# Patient Record
Sex: FEMALE | Race: BLACK OR AFRICAN AMERICAN | NOT HISPANIC OR LATINO | URBAN - METROPOLITAN AREA
[De-identification: names, ages, dates, MRNs, and addresses within clinical notes are randomized per-mention and may not be internally consistent; named-entity substitution may affect disease eponyms.]

---

## 2019-11-05 PROBLEM — Z00.00 ENCOUNTER FOR PREVENTIVE HEALTH EXAMINATION: Status: ACTIVE | Noted: 2019-11-05

## 2019-11-12 ENCOUNTER — OUTPATIENT (OUTPATIENT)
Dept: OUTPATIENT SERVICES | Facility: HOSPITAL | Age: 39
LOS: 1 days | End: 2019-11-12
Payer: MEDICAID

## 2019-11-12 ENCOUNTER — APPOINTMENT (OUTPATIENT)
Dept: ORTHOPEDIC SURGERY | Facility: CLINIC | Age: 39
End: 2019-11-12
Payer: MEDICAID

## 2019-11-12 VITALS
BODY MASS INDEX: 31.83 KG/M2 | WEIGHT: 210 LBS | HEIGHT: 68 IN | DIASTOLIC BLOOD PRESSURE: 70 MMHG | SYSTOLIC BLOOD PRESSURE: 110 MMHG

## 2019-11-12 DIAGNOSIS — Z82.49 FAMILY HISTORY OF ISCHEMIC HEART DISEASE AND OTHER DISEASES OF THE CIRCULATORY SYSTEM: ICD-10-CM

## 2019-11-12 DIAGNOSIS — Z78.9 OTHER SPECIFIED HEALTH STATUS: ICD-10-CM

## 2019-11-12 DIAGNOSIS — Z83.3 FAMILY HISTORY OF DIABETES MELLITUS: ICD-10-CM

## 2019-11-12 DIAGNOSIS — M16.0 BILATERAL PRIMARY OSTEOARTHRITIS OF HIP: ICD-10-CM

## 2019-11-12 DIAGNOSIS — Z72.3 LACK OF PHYSICAL EXERCISE: ICD-10-CM

## 2019-11-12 PROCEDURE — 73564 X-RAY EXAM KNEE 4 OR MORE: CPT | Mod: 26,LT,RT

## 2019-11-12 PROCEDURE — 72170 X-RAY EXAM OF PELVIS: CPT

## 2019-11-12 PROCEDURE — 99204 OFFICE O/P NEW MOD 45 MIN: CPT

## 2019-11-12 PROCEDURE — 73564 X-RAY EXAM KNEE 4 OR MORE: CPT

## 2019-11-12 PROCEDURE — 72170 X-RAY EXAM OF PELVIS: CPT | Mod: 26

## 2019-11-12 RX ORDER — MULTIVIT-MIN/IRON/FOLIC ACID/K 18-600-40
CAPSULE ORAL
Refills: 0 | Status: ACTIVE | COMMUNITY

## 2019-11-12 RX ORDER — GLUC/MSM/COLGN2/HYAL/ANTIARTH3 375-375-20
TABLET ORAL
Refills: 0 | Status: ACTIVE | COMMUNITY

## 2019-11-12 NOTE — PROCEDURE
[Injection] : Injection [Effusion] : Effusion [Left] : of the left [Knee Joint] : knee joint [Patient] : patient [Diagnostic] : Diagnostic [Inflammation] : Inflammation [Alcohol] : Alcohol [Betadine] : Betadine [Ethyl Chloride Spray] : ethyl chloride spray was used as a topical anesthetic [Lateral] : lateral [18] : an 18-gauge [1% Lidocaine___(mL)] : [unfilled] mL of 1% Lidocaine [Triamcinolone 40mg/mL___(mL)] : [unfilled] ~UmL of 40mg/mL triamcinolone [Bandage Applied] : a bandage [Tolerated Well] : The patient tolerated the procedure well [None] : none

## 2019-11-12 NOTE — DISCUSSION/SUMMARY
[de-identified] : 38y/o female with left knee medial meniscal tear, left knee osteoarthritis, bilateral hip osteoarthritis, left sided sciatica\par - Discussed natural history of these issues with her and reviewed treatment options. She seemed to be interested in left knee arthroscopy but given that she hasn't had mechanical symptoms in some months and many other sources of pain, I'd like to try other options first\par - CSI left knee given as detailed above; patient tolerated well and reported improved pain and ease of ambulation\par - Start meloxicam\par - Cont PT\par - Refer to nutritionist for weight loss\par - RTC 8 weeks. If any repeat mechanical symptoms or progression of medial left knee pain, would consider L knee arthroscopy. Also may consider referral to Pain pending progress with the sciatica

## 2019-11-12 NOTE — PHYSICAL EXAM
[de-identified] : General appearance: well nourished and hydrated, pleasant, alert and oriented x 3, cooperative.  \par         HEENT Normocephalic, EOM intact, Nasal septum midline, Oral cavity clear, External auditory canal clear.  \par         Cardiovascular: no apparent abnormalities, no lower leg edema, no varicosities, pedal pulses are palpable.  \par         Lymphatics Lymph nodes: none palpated, Lymphedema: not present.  \par         Neurologic: sensation is normal, no muscle weakness in upper or lower extremities, patella tendon reflexes intact .  \par         Dermatologic no apparent skin lesions, moist, warm, no rash.  \par         Spine: cervical spine appears normal and moves freely, thoracic spine appears normal and moves freely, lumbosacral spine with limited range of motion and tenderness to palpation at the midline and paraspinal musculature; bilateral sacroiliac joints tender to palpation.  \par         Gait: antalgic on left\par \par Left knee\par         Inspection: trace effusion, no erythema.  \par         Wounds: none.  \par         Alignment: normal.  \par         Palpation: medial joint line, medial patellar facet tenderness on palpation.  \par         ROM active: 0-135, no pain on extremes of motion\par         Ligamentous laxity: all ligaments appear stable, negative Lachman, negative ant. drawer test, negative post. drawer test, negative pivot shift test, stable to varus stress test, stable to valgus stress test.  \par         Meniscal Test: painful Chip, painful Franky.  \par         Patellofemoral Alignment Test: Q angle-, normal.  \par         Popliteal angle: 60 degrees\par         Muscle Test: good quad strength.  \par \par Right knee\par         Inspection: no effusion, no erythema.  \par         Wounds: none.  \par         Alignment: normal.  \par         Palpation: no specific tenderness on palpation.  \par         ROM active: 0-135, no pain on extremes of motion\par         Ligamentous laxity : all ligaments appear stable, negative Lachman, negative ant. drawer test, negative post. drawer test, negative pivot shift test, stable to varus stress test, stable to valgus stress test.  \par         Meniscal Test: negative Franky, negative Chip.  \par         Patellofemoral Alignment Test: Q-angle-, normal.  \par         Popliteal angle: 60 degrees\par         Muscle Test: good quad strength.\par \par Clinically significant apparent LLD: RLE about 2-3cm longer than LLE\par \par Left hip:\par         Skin intact, no scars or other prior surgical incisions noted\par         No swelling/ecchymosis\par         No specific tenderness on palpation\par         ROM 0-100 flexion, 0 IR, 45 ER, 0 ADD, 40 ABD\par         JOSS and FADIR both painful\par         López positive\par         Stinchfield positive\par         Flexor power 5/5, abductor power 5/5\par \par Right hip:\par         Skin intact, no scars or other prior surgical incisions noted\par         No swelling/ecchymosis\par         No specific tenderness on palpation\par         ROM 0-110 flexion, 0 IR, 45 ER, 0 ADD, 40 ABD\par         JOSS painless, FADIR painless\par         López positive\par         Stinchfield positive\par         Flexor power 5/5, abductor power 5/5\par \par Left ankle\par         Inspection: no erythema noted, no swelling noted.  \par         Palpation: no pain on palpation .  \par         ROM: FROM without crepitus.  \par         Muscle strength: 5/5.  \par         Stability: no instability noted.  \par \par Right ankle\par         Inspection: no erythema noted, no swelling noted.  \par         ROM: FROM without crepitus.  \par         Palpation: no pain on palpation .  \par         Muscle strength: 5/5.  \par         Stability: no instability noted.  \par \par Left foot\par         Inspection: color, texture and turgor are normal.  \par         ROM: full range of motion of all joints without pain or crepitus.  \par         Palpation: no tenderness.  \par         Stability: no instability noted.  \par \par Right foot\par         Inspection: color, texture and turgor are normal.  \par         ROM: full range of motion of all joints without pain or crepitus.  \par         Palpation: no tenderness.  \par         Stability: no instability noted.\par  [de-identified] : Pelvic and knee XRs were taken today. There is bilateral hip osteoarthritis, moderate radiographic severity. Bilateral sacroiliac joint arthrosis. There seems to be some pelvic obliquity. The bilateral knees demonstrate normal alignment. There is slight narrowing of the left medial compartment, otherwise no suggestion of arthritis. Patellae sit at appropriate height and track centrally. There are some traction osteophytes at the patellae.\par \par MRI reports also reviewed, dating Feb-Apr 2019. Significant finding of radial tear left medial meniscus. Remainder of findings available on chart.
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
## 2020-01-16 ENCOUNTER — APPOINTMENT (OUTPATIENT)
Dept: ORTHOPEDIC SURGERY | Facility: CLINIC | Age: 40
End: 2020-01-16

## 2020-01-27 ENCOUNTER — APPOINTMENT (OUTPATIENT)
Dept: ORTHOPEDIC SURGERY | Facility: CLINIC | Age: 40
End: 2020-01-27
Payer: MEDICAID

## 2020-01-27 DIAGNOSIS — M17.12 UNILATERAL PRIMARY OSTEOARTHRITIS, LEFT KNEE: ICD-10-CM

## 2020-01-27 DIAGNOSIS — S83.242A OTHER TEAR OF MEDIAL MENISCUS, CURRENT INJURY, LEFT KNEE, INITIAL ENCOUNTER: ICD-10-CM

## 2020-01-27 PROCEDURE — 99214 OFFICE O/P EST MOD 30 MIN: CPT | Mod: 57

## 2020-01-27 RX ORDER — MELOXICAM 7.5 MG/1
7.5 TABLET ORAL DAILY
Qty: 30 | Refills: 1 | Status: ACTIVE | COMMUNITY
Start: 2019-11-12 | End: 1900-01-01

## 2020-01-27 NOTE — HISTORY OF PRESENT ILLNESS
[de-identified] : 40y/o female f/u for L knee OA + MMT. Also with concurrent L sciatica. The CSI last visit only gave about a week of relief. The meloxicam has helped and she is still taking it (requesting refill). She hasn't done any more PT and is not doing any home exercise. She's scheduled for what sounds like a left sacroiliac injection tomorrow. \par \par Left knee is overall the same without significant change. Still with predominantly medial joint line pain with any activity. Also with milder anterior and lateral knee pain worsened by stairs.

## 2020-01-27 NOTE — DISCUSSION/SUMMARY
[de-identified] : 38y/o female with left knee osteoarthritis and medial meniscal tear; as well as left sciatica\par - Conservative treatment modalities have failed for the left knee and she feels that the knee is getting in the way of her daily activities and hampering her quality of life. I think arthroscopic partial medial meniscectomy is a reasonable option at this point. I do also think that the targeting low back injections being performed tomorrow represent a valuable opportunity to see how much of the leg/knee pain is due to the meniscus/arthritis and how much is being referred down from proximally. I suggested that she go through with the injection(s) tomorrow and keep close tabs on how much of the distal pain is relieved. Will follow up with her via phone in two weeks to review the effectiveness and likely book for left knee surgical arthroscopy. The risks, benefits, and alternatives of the procedure were reviewed and all of her questions were answered to her satisfaction. I emphasized that the surgery is unlikely to relieve 100% of her pain and that the patellofemoral pain in particular will require ongoing treatment with PT/HEP and anti-inflammatories. She understands.\par - HEP reviewed; patient says she will adhere this time\par - Refill meloxicam

## 2020-01-27 NOTE — PHYSICAL EXAM
[de-identified] : General appearance: well nourished and hydrated, pleasant, alert and oriented x 3, cooperative.  \par HEENT: normocephalic, EOM intact, nasal septum midline, oral cavity clear, external auditory canal clear.  \par Cardiovascular: no lower leg edema, no varicosities, dorsalis pedis pulses palpable and symmetric.  \par Lymphatics: no palpable lymphadenopathy, no lymphedema.  \par Neurologic: sensation is normal, no muscle weakness in upper or lower extremities, patella tendon reflexes present and symmetric.  \par Dermatologic: skin moist, warm, no rash.  \par Spine: cervical spine with normal lordosis and painless range of motion, thoracic spine with normal kyphosis and painless range of motion, lumbosacral spine with normal lordosis and painless range of motion.  No tenderness to palpation along midline spine and paraspinal musculature.  Sacroiliac joint tender on the left. Negative SLR and crossed SLR tests bilaterally.\par Gait: left antalgia.\par \par Left knee:\par - Inspection: trace effusion, negative swelling, ecchymosis, and erythema.  \par - Wounds: none.  \par - Alignment: normal.  \par - Palpation: marked medial joint line tenderness on palpation, less intense lateral joint line and peripatellar tenderness.  \par - ROM active: 0-125, medial and anterior discomfort at extremes\par - Ligamentous laxity: negative Lachman, negative ant. drawer test, negative post. drawer test, negative pivot shift test, stable to varus stress, stable to valgus stress.  \par - Meniscal Test: sharply positive Chip and Franky.  \par - Popliteal angle: 90 degrees\par - Muscle Test: 5/5 quad strength. [de-identified] : No new imaging

## 2020-03-30 ENCOUNTER — NON-APPOINTMENT (OUTPATIENT)
Age: 40
End: 2020-03-30

## 2020-04-10 ENCOUNTER — APPOINTMENT (OUTPATIENT)
Dept: ORTHOPEDIC SURGERY | Facility: HOSPITAL | Age: 40
End: 2020-04-10

## 2020-07-03 ENCOUNTER — LABORATORY RESULT (OUTPATIENT)
Age: 40
End: 2020-07-03

## 2020-07-06 ENCOUNTER — OUTPATIENT (OUTPATIENT)
Dept: OUTPATIENT SERVICES | Facility: HOSPITAL | Age: 40
LOS: 1 days | Discharge: HOME | End: 2020-07-06
Payer: MEDICAID

## 2020-07-06 ENCOUNTER — APPOINTMENT (OUTPATIENT)
Dept: ORTHOPEDIC SURGERY | Facility: CLINIC | Age: 40
End: 2020-07-06

## 2020-07-06 PROCEDURE — 29870 ARTHRS KNEE DX W/WO SYN BX: CPT | Mod: LT

## 2020-07-22 ENCOUNTER — APPOINTMENT (OUTPATIENT)
Dept: ORTHOPEDIC SURGERY | Facility: CLINIC | Age: 40
End: 2020-07-22
Payer: MEDICAID

## 2020-07-22 VITALS
SYSTOLIC BLOOD PRESSURE: 115 MMHG | OXYGEN SATURATION: 98 % | HEIGHT: 68 IN | HEART RATE: 88 BPM | WEIGHT: 216 LBS | DIASTOLIC BLOOD PRESSURE: 81 MMHG | BODY MASS INDEX: 32.74 KG/M2

## 2020-07-22 DIAGNOSIS — M54.32 SCIATICA, LEFT SIDE: ICD-10-CM

## 2020-07-22 PROCEDURE — 99024 POSTOP FOLLOW-UP VISIT: CPT

## 2020-07-22 NOTE — HISTORY OF PRESENT ILLNESS
[de-identified] : First post op visit left knee arthroscopy, surgical date 7/6/2020 [de-identified] : No new issues since the surgery. Medial knee pain remains about the same. No wound problems. She is back to her normal daily activity. [de-identified] : Left knee: portals healed, sutures removed. No drainage/erythema/fluctuance. Knee ROM 0-135, stable barbara/camron/ant/post, good quad strength. Normal non-antalgic gait. [de-identified] : 38y/o female 2 weeks s/p left knee diagnostic arthroscopy with findings of no intraarticular pathology [de-identified] : I reviewed the surgical photos with her and gave her a copy. There was no chondral, meniscal, or ligamentous injury that I could identify. Likewise no excessive synovitis or loose bodies. At this point I think that the more likely culprit for this persistent pain is likely radicular. She has had good relief from spinal injections in the past so will refer to Dr. Thomas for further treatment. Also sending back to PT; HEP also encouraged. She will follow up with me as needed.

## 2021-10-08 ENCOUNTER — RESULT REVIEW (OUTPATIENT)
Age: 41
End: 2021-10-08

## 2021-10-08 ENCOUNTER — OUTPATIENT (OUTPATIENT)
Dept: OUTPATIENT SERVICES | Facility: HOSPITAL | Age: 41
LOS: 1 days | End: 2021-10-08
Payer: MEDICAID

## 2021-10-08 ENCOUNTER — APPOINTMENT (OUTPATIENT)
Dept: ORTHOPEDIC SURGERY | Facility: CLINIC | Age: 41
End: 2021-10-08
Payer: MEDICAID

## 2021-10-08 VITALS
HEART RATE: 79 BPM | SYSTOLIC BLOOD PRESSURE: 110 MMHG | OXYGEN SATURATION: 98 % | HEIGHT: 68 IN | BODY MASS INDEX: 28.04 KG/M2 | DIASTOLIC BLOOD PRESSURE: 80 MMHG | WEIGHT: 185 LBS

## 2021-10-08 DIAGNOSIS — M25.512 PAIN IN LEFT SHOULDER: ICD-10-CM

## 2021-10-08 DIAGNOSIS — G89.29 PAIN IN LEFT SHOULDER: ICD-10-CM

## 2021-10-08 DIAGNOSIS — S43.432A SUPERIOR GLENOID LABRUM LESION OF LEFT SHOULDER, INITIAL ENCOUNTER: ICD-10-CM

## 2021-10-08 PROCEDURE — 73030 X-RAY EXAM OF SHOULDER: CPT

## 2021-10-08 PROCEDURE — 99203 OFFICE O/P NEW LOW 30 MIN: CPT

## 2021-10-08 PROCEDURE — 73030 X-RAY EXAM OF SHOULDER: CPT | Mod: 26,LT

## 2021-10-08 RX ORDER — MELOXICAM 15 MG/1
15 TABLET ORAL DAILY
Qty: 30 | Refills: 1 | Status: ACTIVE | COMMUNITY
Start: 2021-10-08 | End: 1900-01-01

## 2021-10-08 NOTE — HISTORY OF PRESENT ILLNESS
[5] : a current pain level of 5/10 [de-identified] : The patient is a 41 year old, rhd woman presenting with left shoulder pain\par \par She is currently unemployed.\par \par She presents with a 2 month history of chronic, traumatic left posterior shoulder pain.  Pain began after a slip and fallin 2019 when she landed on all fours.  She had pain and weakness following the event, and had a period of inactivity for over 1.5 years.  She only recently started physical therapy, which she has been going to for 6 weeks.  She states that it has not improved her pain, and in fact, has caused more soreness.  She takes Motrin intermittently for pain.  \par \par She had Stand-UP MRI, but images not available for view.  Results showed chronically retracted long head biceps tear, RTC tendinopathy and posterior glenoid labral tear.\par \par Pain is rated 5/10, described as sharp, improved with heat, worse with lying on affected side.

## 2021-10-08 NOTE — PHYSICAL EXAM
[de-identified] : General: Well-nourished, well-developed, alert, and in no acute distress.\par Head: Normocephalic.\par Eyes: Pupils equal round reactive to light and accommodation, extraocular muscles intact, normal sclera.\par Nose: No nasal discharge.\par Cardiac: Regular rate. Extremities are warm and well perfused. Distal pulses are symmetric bilaterally.\par Respiratory: No labored breathing.\par Extremities: Sensation is intact distally bilaterally.  Distal pulses are symmetric bilaterally\par Lymphatic: No regional lymphadenopathy, no lymphedema\par Neurologic: No focal deficits\par Skin: Normal skin color, texture, and turgor\par Psychiatric: Normal affect\par MSK: as noted above/below\par \par \par \par RIGHT SHOULDER:\par  \par Inspection:no bruising, rash, erythema, deformity\par Joint Effusion:no\par ROM:normal Forward Flexion, Abduction , Internal Rotation: , External Rotation \par Palpation: NO AC joint pain, Bicipital Groove Pain , GH joint pain , Clavicle pain , GT pain \par Distal Pulses:normal\par Upper Extremity Reflexes:2+\par Shoulder Strength: 5/5 \par Upper Extremity Sensation: normal\par \par Special Tests:\par Empty Can: Negative \par Lift-Off Test: Negative \par O'Briens: Negative\par Cross Arm: Negative\par Neer: Negative\par Morton: Negative\par Speed: Negative\par Apprehension:Negative\par \par LEFT SHOULDER:\par  \par Inspection:no bruising, rash, erythema, deformity\par Joint Effusion:no\par ROM:normal Forward Flexion, Abduction , Internal Rotation: , External Rotation \par Palpation:PAIN AT POSTERIOR GH JOINT,  NO AC joint pain, Bicipital Groove Pain ,  Clavicle pain , GT pain \par Distal Pulses:normal\par Upper Extremity Reflexes:2+\par Shoulder Strength: 5/5 \par Upper Extremity Sensation: normal\par \par Special Tests:\par Empty Can: Negative \par Lift-Off Test: Negative \par O'Briens: POSITIVE\par Cross Arm: POSITIVE\par Neer: Negative\par Morton: POSITIVE\par Speed: PAINFUL\par Apprehension:PAINFUL\par \par  [de-identified] : Xray LEFT Shoulder - Multiple views were reviewed with the patient in detail.  There is no acute fracture or dislocation.  There is no joint effusion.  Joint spaces are preserved. We will await formal radiology reading.\par \par

## 2021-10-08 NOTE — DISCUSSION/SUMMARY
[Medication Risks Reviewed] : Medication risks reviewed [de-identified] : The patient is a 41 year old, RHD woman presenting with a 2 year history of chronic left posterior shoulder pain after slip and fall in 2019.  MRI (no images available today) show chronic LHB retracted tear, posterior glenoid labral tear.  I suspect pain mostly related to labral tear and component of instability.\par \par Imaging/Diagnostics/Medical Records were interpreted and/or reviewed and results were reviewed with the patient in detail.  All questions were answered appropriately.\par \par The patient was counseled on the natural progression of the problem(s) today and potential complications of diagnoses.  The patient was provided reassurance today.\par \par Discussed potential role for diagnostic/therapeutic glenohumeral joint injection, but patient would like to think about it.\par \par She will consider surgical consultation with Dr. Alberto.\par \par Advised her to continue PT to improve shoulder stability.\par \par Patient was prescribed a short course of Meloxicam. .Appropriate use of medication was reviewed with the patient in detail. Risks, benefits, and adverse effects medication were discussed.\par \par ------------------------------------------------------------------------------------------------------------------\par Patient appreciates and agrees with current plan.\par \par The patient's diagnosis above was evaluated by me, personally.  Diagnostic Testing and treatment options were discussed with the patient in detail.  The risks/benefits/potential complications of diagnostic testing/treatments were described in detail.  \par \par This note was generated using a mixture of manual typing and dragon medical dictation software.  A reasonable effort has been made for proofreading its contents, but typos may still remain.  If there are any questions or points of clarification needed please notify my office.\par \par \par >30 minutes of time was spent on total encounter.  >50% of the visit was spent on face-to-face counseling/coordination of care and medical-decision making for this patient.\par \par